# Patient Record
Sex: MALE | HISPANIC OR LATINO | Employment: FULL TIME | URBAN - METROPOLITAN AREA
[De-identification: names, ages, dates, MRNs, and addresses within clinical notes are randomized per-mention and may not be internally consistent; named-entity substitution may affect disease eponyms.]

---

## 2024-06-20 ENCOUNTER — OCCUPATIONAL MEDICINE (OUTPATIENT)
Dept: URGENT CARE | Facility: PHYSICIAN GROUP | Age: 39
End: 2024-06-20
Payer: COMMERCIAL

## 2024-06-20 VITALS
BODY MASS INDEX: 25.48 KG/M2 | RESPIRATION RATE: 16 BRPM | DIASTOLIC BLOOD PRESSURE: 72 MMHG | SYSTOLIC BLOOD PRESSURE: 106 MMHG | HEIGHT: 69 IN | HEART RATE: 90 BPM | TEMPERATURE: 98.3 F | OXYGEN SATURATION: 97 % | WEIGHT: 172 LBS

## 2024-06-20 DIAGNOSIS — S05.01XA ABRASION OF RIGHT CORNEA, INITIAL ENCOUNTER: ICD-10-CM

## 2024-06-20 PROCEDURE — 99204 OFFICE O/P NEW MOD 45 MIN: CPT

## 2024-06-20 PROCEDURE — 1126F AMNT PAIN NOTED NONE PRSNT: CPT

## 2024-06-20 RX ORDER — ERYTHROMYCIN 5 MG/G
OINTMENT OPHTHALMIC
Qty: 3.5 G | Refills: 0 | Status: SHIPPED | OUTPATIENT
Start: 2024-06-20

## 2024-06-20 ASSESSMENT — PAIN SCALES - GENERAL: PAINLEVEL: NO PAIN

## 2024-06-20 NOTE — PROGRESS NOTES
"Subjective:     Richard Gordon is a 38 y.o. male who presents for Other (New WC- DOI 06/18/2024/[employer] /Pt was loading a truck lifted a box felt something go into his right eye at 9.30 am pt still feels something in right eye)      DOI 5/18/2024: Patient reports that he was working in loading a  truck at work.  Patient reports while he was doing this he felt something go into his right eye.  Patient unsure of what went into his eye at that time.  Patient did wash his eyes out after the initial incident.  Patient does report that he has had continued foreign body sensation to eye since incident.  Patient denies any loss of vision or pain to eye.    PMH:   No pertinent past medical history to this problem  MEDS:  Medications were reviewed in EMR  ALLERGIES:  Allergies were reviewed in EMR  [employer]:  Works as a   FH:   No pertinent family history to this problem       Objective:     /72 (BP Location: Right arm, Patient Position: Sitting, BP Cuff Size: Adult)   Pulse 90   Temp 36.8 °C (98.3 °F) (Temporal)   Resp 16   Ht 1.753 m (5' 9\")   Wt 78 kg (172 lb)   SpO2 97%   BMI 25.40 kg/m²     Physical Exam  Vitals reviewed.   Constitutional:       General: He is not in acute distress.     Appearance: Normal appearance. He is not ill-appearing.   HENT:      Head: Normocephalic and atraumatic.      Right Ear: Tympanic membrane normal.      Left Ear: Tympanic membrane normal.      Nose: Nose normal.      Mouth/Throat:      Mouth: Mucous membranes are moist.      Pharynx: Oropharynx is clear.   Eyes:      General: Lids are normal. Lids are everted, no foreign bodies appreciated.         Right eye: No foreign body, discharge or hordeolum.      Conjunctiva/sclera: Conjunctivae normal.      Right eye: Chemosis present.      Pupils: Pupils are equal, round, and reactive to light.         Comments: Noted corneal abrasion to medial aspect of right eye above pupil.  Cardiovascular:      Rate " and Rhythm: Normal rate.      Heart sounds: Normal heart sounds.   Pulmonary:      Effort: Pulmonary effort is normal.      Breath sounds: Normal breath sounds.   Abdominal:      General: Abdomen is flat.      Palpations: Abdomen is soft.   Skin:     General: Skin is warm and dry.      Capillary Refill: Capillary refill takes less than 2 seconds.   Neurological:      Mental Status: He is alert and oriented to person, place, and time.   Psychiatric:         Mood and Affect: Mood normal.         Behavior: Behavior normal.     Visual acuity for patient was 20/20 for left/right/both eyes    Eye examination was performed with use of proparacaine, fluorescein, and Woods lamp.  Patient tolerated procedure well.  No foreign body identified.  Corneal abrasion was noted.      Assessment/Plan:       1. Abrasion of right cornea, initial encounter  - erythromycin 5 MG/GM Ointment; Instill 1 cm ribbon onto lower eyelid every 6 hours while awake x7 days.  Dispense: 3.5 g; Refill: 0    Released to Full Duty FROM   TO    Patient to use erythromycin ointment as prescribed for next 7 days.  Patient to follow-up in 7 days for recheck.       Differential diagnosis, natural history, supportive care, and indications for immediate follow-up discussed.

## 2024-06-27 ENCOUNTER — OCCUPATIONAL MEDICINE (OUTPATIENT)
Dept: URGENT CARE | Facility: PHYSICIAN GROUP | Age: 39
End: 2024-06-27
Payer: COMMERCIAL

## 2024-06-27 VITALS
OXYGEN SATURATION: 99 % | WEIGHT: 174 LBS | HEIGHT: 69 IN | HEART RATE: 105 BPM | RESPIRATION RATE: 18 BRPM | BODY MASS INDEX: 25.77 KG/M2 | TEMPERATURE: 98.4 F | SYSTOLIC BLOOD PRESSURE: 112 MMHG | DIASTOLIC BLOOD PRESSURE: 70 MMHG

## 2024-06-27 DIAGNOSIS — S05.01XD ABRASION OF RIGHT CORNEA, SUBSEQUENT ENCOUNTER: ICD-10-CM

## 2024-06-27 PROCEDURE — 3074F SYST BP LT 130 MM HG: CPT | Performed by: PHYSICIAN ASSISTANT

## 2024-06-27 PROCEDURE — 3078F DIAST BP <80 MM HG: CPT | Performed by: PHYSICIAN ASSISTANT

## 2024-06-27 PROCEDURE — 99212 OFFICE O/P EST SF 10 MIN: CPT | Performed by: PHYSICIAN ASSISTANT

## 2024-06-27 ASSESSMENT — VISUAL ACUITY: OU: 1

## 2024-06-27 NOTE — PROGRESS NOTES
"Subjective:   Richard Gordon is a 38 y.o. 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  Patient reports while he was doing this he felt something go into his right eye.  Patient unsure of what went into his eye at that time.  Patient did wash his eyes out after the initial incident.  Patient does report that he has had continued foreign body sensation to eye since incident.  Patient denies any loss of vision or pain to eye.    Dx: Abrasion of right cornea   Tx: Erythromycin ophthalmic ointment     Follow up 6/27/25: Patient reports his symptoms resolved.  He is on his last day of erythromycin ophthalmic ointment.  He denies any symptoms.  Denies any eye redness, eye drainage, eye pain, vision changes, eye foreign body.  He has no other complaints or concerns.  100% better.  He is ready to return to work full duty discharge MMI.         History reviewed. No pertinent past medical history.  No Known Allergies     Objective:     /70 (BP Location: Right arm, Patient Position: Sitting, BP Cuff Size: Adult)   Pulse (!) 105   Temp 36.9 °C (98.4 °F) (Temporal)   Resp 18   Ht 1.753 m (5' 9\")   Wt 78.9 kg (174 lb)   SpO2 99%   BMI 25.70 kg/m²     Physical Exam  Vitals reviewed.   Constitutional:       General: He is not in acute distress.     Appearance: Normal appearance. He is not ill-appearing or toxic-appearing.   Eyes:      General: Vision grossly intact.         Right eye: No foreign body or discharge.      Conjunctiva/sclera: Conjunctivae normal.      Right eye: Right conjunctiva is not injected. No exudate.     Pupils: Pupils are equal, round, and reactive to light.   Cardiovascular:      Rate and Rhythm: Normal rate.   Pulmonary:      Effort: Pulmonary effort is normal.   Musculoskeletal:      Cervical back: Neck supple.   Skin:     " General: Skin is warm and dry.   Neurological:      General: No focal deficit present.      Mental Status: He is alert and oriented to person, place, and time.   Psychiatric:         Mood and Affect: Mood normal.         Behavior: Behavior normal.         Diagnosis and associated orders:     1. Abrasion of right cornea, subsequent encounter       Comments/MDM:     Discharge MMI Full duty            Please note that this dictation was created using voice recognition software. I have made a reasonable attempt to correct obvious errors, but I expect that there are errors of grammar and possibly content that I did not discover before finalizing the note.    This note was electronically signed by Bishop Chanel PA-C